# Patient Record
Sex: FEMALE | Race: WHITE | NOT HISPANIC OR LATINO | Employment: FULL TIME | URBAN - NONMETROPOLITAN AREA
[De-identification: names, ages, dates, MRNs, and addresses within clinical notes are randomized per-mention and may not be internally consistent; named-entity substitution may affect disease eponyms.]

---

## 2019-11-19 ENCOUNTER — OFFICE VISIT (OUTPATIENT)
Dept: OBSTETRICS AND GYNECOLOGY | Facility: CLINIC | Age: 44
End: 2019-11-19

## 2019-11-19 VITALS
DIASTOLIC BLOOD PRESSURE: 60 MMHG | WEIGHT: 128 LBS | BODY MASS INDEX: 21.85 KG/M2 | SYSTOLIC BLOOD PRESSURE: 100 MMHG | HEIGHT: 64 IN | HEART RATE: 85 BPM

## 2019-11-19 DIAGNOSIS — Z01.419 ENCOUNTER FOR GYNECOLOGICAL EXAMINATION WITH PAPANICOLAOU SMEAR OF CERVIX: Primary | ICD-10-CM

## 2019-11-19 DIAGNOSIS — B37.31 CANDIDA VAGINITIS: ICD-10-CM

## 2019-11-19 DIAGNOSIS — N63.21 BREAST LUMP ON LEFT SIDE AT 2 O'CLOCK POSITION: ICD-10-CM

## 2019-11-19 PROCEDURE — 99396 PREV VISIT EST AGE 40-64: CPT | Performed by: NURSE PRACTITIONER

## 2019-11-19 PROCEDURE — 87624 HPV HI-RISK TYP POOLED RSLT: CPT | Performed by: NURSE PRACTITIONER

## 2019-11-19 PROCEDURE — 88141 CYTOPATH C/V INTERPRET: CPT | Performed by: PATHOLOGY

## 2019-11-19 PROCEDURE — 87210 SMEAR WET MOUNT SALINE/INK: CPT | Performed by: NURSE PRACTITIONER

## 2019-11-19 PROCEDURE — 88142 CYTOPATH C/V THIN LAYER: CPT | Performed by: NURSE PRACTITIONER

## 2019-11-19 RX ORDER — TOPIRAMATE 25 MG/1
TABLET ORAL
Refills: 0 | COMMUNITY
Start: 2019-11-08 | End: 2021-11-20

## 2019-11-19 RX ORDER — FLUCONAZOLE 150 MG/1
150 TABLET ORAL ONCE
Qty: 2 TABLET | Refills: 0 | Status: SHIPPED | OUTPATIENT
Start: 2019-11-19 | End: 2019-11-19

## 2019-11-19 NOTE — PROGRESS NOTES
"Subjective   Chief Complaint   Patient presents with   • Gynecologic Exam     well woman annual visit     Shana Snyder is a 44 y.o. year old  presenting to be seen for her annual exam.  Today she has concerns about a possible \"breast cyst\" in the left breast. Mother has fibrocystic breasts as well. Pt has never had a Mammogram. No family hx of breast cancer. It is tender at times.     Pt also concerned about possible yeast infection. She states she gets them when she takes antibiotics. She didn't want to use OTC monistat for fear it would mess with pap testing. She has mild burning/itching, and a little white discharge.     Patient's last menstrual period was 11/10/2019 (exact date).    OB History      Para Term  AB Living    3 3            SAB TAB Ectopic Molar Multiple Live Births                         Current birth control method: coitus interruptus, declines any contraceptive options.     She is sexually active.  In the past 12 months there has not been new sexual partners.  Condoms are not typically used.  She would not like to be screened for STD's at today's exam.     Past 6 month menstrual history:    Cycle Frequency: regular, predictable and consistent every 28 - 32 days   Menstrual cycle character: flow is typically light   Cycle Duration: 4 - 5   Number of heavy days of flows: 0   Dysmenorrhea: mild and is not affecting her activities of daily living   PMS: is not affecting her activities of daily living   Intermenstrual bleeding present: no   Post-coital bleeding present: no     She exercises regularly: yes.  She wears her seat belt:yes.  She has concerns about domestic violence: no.  Last colonoscopy: Never  Last DEXA: Never  Last MMG: Never  Last pap:   History of abnormal PAP: no    The following portions of the patient's history were reviewed and updated as appropriate:problem list, current medications, allergies, past family history, past medical history, past social " "history and past surgical history.    Social History    Tobacco Use      Smoking status: Never Smoker      Smokeless tobacco: Never Used    Social History     Substance and Sexual Activity   Alcohol Use No      Review of Systems   Constitutional: Negative.  Negative for chills and fever.   Respiratory: Negative.    Cardiovascular: Negative.    Gastrointestinal: Negative.    Endocrine: Negative.    Genitourinary: Negative.  Negative for menstrual problem. Vaginal discharge: mild, itching.        Left breast lump   Skin: Negative.    Neurological: Positive for headaches (just started topamax). Negative for dizziness, syncope and light-headedness.   Psychiatric/Behavioral: Negative for suicidal ideas. The patient is not nervous/anxious.          Objective   /60   Pulse 85   Ht 162.6 cm (64\")   Wt 58.1 kg (128 lb)   LMP 11/10/2019 (Exact Date)   Breastfeeding? No   BMI 21.97 kg/m²     General:  well developed; well nourished  no acute distress   Skin:  No suspicious lesions seen   Thyroid: not examined   Breasts:  Examined in supine position  Nipples normal without inversion, lesions or discharge  There are no palpable axillary nodes  symmetric without skin dimpling, Left breast: upper, outer quadrant around the 2:00 position there is a pea size nodule, mobile, mildly tender to manipulation. Pt has generalized fibrocystic tissue bilaterally.    Abdomen: soft, non-tender; no masses  no umbilical or inguinal hernias are present  no hepato-splenomegaly  Normal findings: bowel sounds normal   Cardiac: Heart sounds are normal.  Regular rate and rhythm without murmur, gallop or rub.   Resp: Normal expansion.  Clear to auscultation.  No rales, rhonchi, or wheezing.   Psych: alert,oriented, in NAD with a full range of affect, normal behavior and no psychotic features   Pelvis: Uterus:  Clinical staff was present for exam  External genitalia:  normal appearance of the external genitalia including Bartholin's and " [place]'s glands.  :  urethral meatus normal;  Vaginal:  normal pink mucosa without prolapse or lesions, discharge present -  white and thick and wet prep done: finding =  clue cells are absent, pseudo-hyphae are present and trichomonads are absent  Cervix:  normal appearance.  Uterus:  normal size, shape and consistency  Adnexa:  normal bimanual exam of the adnexa.  Rectal:  digital rectal exam not performed; anus visually normal appearing.     Lab Review   No data reviewed    Imaging  No data reviewed       Diagnoses and all orders for this visit:    Encounter for gynecological examination with Papanicolaou smear of cervix  -     Liquid-based Pap Smear, Screening  Pap results: I will send card in mail or call if abnormal. RTC annually for well woman exams.     Breast lump on left side at 2 o'clock position  -     Mammo Diagnostic Digital Tomosynthesis Bilateral With CAD; Future  Discussed findings on exam with pt. I suspect it may be cystic tissue but she has never had mammogram. I have ordered diagnostic imaging and pt is scheduled Thursday for this. We will discuss next steps PRN. Encouraged pt to cut out caffeine and take Vit E for breast tenderness.     Candida vaginitis  -     fluconazole (DIFLUCAN) 150 MG tablet; Take 1 tablet by mouth 1 (One) Time for 1 dose. Repeat dose in 72 hours if still symptomatic   Discussed findings of yeast on wet prep. Treat with Diflucan x 72 hours apart. RTC if symptoms persist, worsen or recur. Encouraged pt to always ask for Diflucan when she is given an antibiotic.     This note was electronically signed.    Pati Forrester, APRN  November 19, 2019

## 2019-11-22 LAB
GEN CATEG CVX/VAG CYTO-IMP: ABNORMAL
LAB AP CASE REPORT: ABNORMAL
LAB AP GYN ADDITIONAL INFORMATION: ABNORMAL
PATH INTERP SPEC-IMP: ABNORMAL
STAT OF ADQ CVX/VAG CYTO-IMP: ABNORMAL

## 2019-11-27 LAB — HPV I/H RISK 4 DNA CVX QL PROBE+SIG AMP: NEGATIVE
